# Patient Record
Sex: FEMALE | Race: WHITE | NOT HISPANIC OR LATINO | Employment: UNEMPLOYED | URBAN - METROPOLITAN AREA
[De-identification: names, ages, dates, MRNs, and addresses within clinical notes are randomized per-mention and may not be internally consistent; named-entity substitution may affect disease eponyms.]

---

## 2020-12-23 ENCOUNTER — OFFICE VISIT (OUTPATIENT)
Dept: FAMILY MEDICINE CLINIC | Facility: CLINIC | Age: 35
End: 2020-12-23

## 2020-12-23 ENCOUNTER — LAB (OUTPATIENT)
Dept: LAB | Facility: HOSPITAL | Age: 35
End: 2020-12-23

## 2020-12-23 VITALS
RESPIRATION RATE: 16 BRPM | OXYGEN SATURATION: 96 % | TEMPERATURE: 98 F | WEIGHT: 293 LBS | SYSTOLIC BLOOD PRESSURE: 150 MMHG | DIASTOLIC BLOOD PRESSURE: 84 MMHG | HEART RATE: 78 BPM

## 2020-12-23 DIAGNOSIS — E11.9 TYPE 2 DIABETES MELLITUS WITHOUT COMPLICATION, WITHOUT LONG-TERM CURRENT USE OF INSULIN (HCC): ICD-10-CM

## 2020-12-23 DIAGNOSIS — E11.9 TYPE 2 DIABETES MELLITUS WITHOUT COMPLICATION, WITHOUT LONG-TERM CURRENT USE OF INSULIN (HCC): Primary | ICD-10-CM

## 2020-12-23 PROBLEM — I10 BENIGN HYPERTENSION: Status: ACTIVE | Noted: 2020-12-23

## 2020-12-23 PROBLEM — M06.9 RHEUMATOID ARTHRITIS INVOLVING MULTIPLE SITES (HCC): Status: ACTIVE | Noted: 2020-12-23

## 2020-12-23 LAB
EST. AVERAGE GLUCOSE BLD GHB EST-MCNC: 148 MG/DL
HBA1C MFR BLD: 6.8 %

## 2020-12-23 PROCEDURE — 99203 OFFICE O/P NEW LOW 30 MIN: CPT | Performed by: PHYSICIAN ASSISTANT

## 2020-12-23 PROCEDURE — 36415 COLL VENOUS BLD VENIPUNCTURE: CPT

## 2020-12-23 PROCEDURE — 83036 HEMOGLOBIN GLYCOSYLATED A1C: CPT

## 2020-12-23 RX ORDER — OLMESARTAN MEDOXOMIL 40 MG/1
40 TABLET ORAL DAILY
COMMUNITY

## 2020-12-23 RX ORDER — GLYBURIDE 5 MG/1
5 TABLET ORAL
COMMUNITY

## 2020-12-24 DIAGNOSIS — E11.9 TYPE 2 DIABETES MELLITUS WITHOUT COMPLICATION, WITHOUT LONG-TERM CURRENT USE OF INSULIN (HCC): Primary | ICD-10-CM

## 2020-12-24 RX ORDER — PEN NEEDLE, DIABETIC 30 GX3/16"
NEEDLE, DISPOSABLE MISCELLANEOUS WEEKLY
Qty: 30 EACH | Refills: 0 | Status: SHIPPED | OUTPATIENT
Start: 2020-12-24

## 2020-12-24 RX ORDER — DULAGLUTIDE 0.75 MG/.5ML
0.75 INJECTION, SOLUTION SUBCUTANEOUS WEEKLY
Qty: 1 PEN | Refills: 0 | Status: SHIPPED | OUTPATIENT
Start: 2020-12-24 | End: 2021-01-29 | Stop reason: SDUPTHER

## 2020-12-29 ENCOUNTER — TELEPHONE (OUTPATIENT)
Dept: FAMILY MEDICINE CLINIC | Facility: CLINIC | Age: 35
End: 2020-12-29

## 2020-12-29 NOTE — TELEPHONE ENCOUNTER
Pt leave a msg in the nurse line stating that she don't know if you are going to send another refill on Trulicity since his sugar are below 200

## 2020-12-31 ENCOUNTER — TELEPHONE (OUTPATIENT)
Dept: FAMILY MEDICINE CLINIC | Facility: CLINIC | Age: 35
End: 2020-12-31

## 2021-01-12 DIAGNOSIS — E11.9 TYPE 2 DIABETES MELLITUS WITHOUT COMPLICATION, WITHOUT LONG-TERM CURRENT USE OF INSULIN (HCC): ICD-10-CM

## 2021-01-12 RX ORDER — PEN NEEDLE, DIABETIC 30 GX3/16"
NEEDLE, DISPOSABLE MISCELLANEOUS WEEKLY
Qty: 30 EACH | Refills: 0 | OUTPATIENT
Start: 2021-01-12

## 2021-01-12 RX ORDER — DULAGLUTIDE 0.75 MG/.5ML
0.75 INJECTION, SOLUTION SUBCUTANEOUS WEEKLY
Qty: 1 PEN | Refills: 0 | OUTPATIENT
Start: 2021-01-12

## 2021-01-12 NOTE — TELEPHONE ENCOUNTER
I called gabriella to schedule a follow up however he is only here temporarily he is a [facility] resident, pt states he will try and get in contact with his pcp

## 2021-01-15 ENCOUNTER — OFFICE VISIT (OUTPATIENT)
Dept: FAMILY MEDICINE CLINIC | Facility: CLINIC | Age: 36
End: 2021-01-15

## 2021-01-15 DIAGNOSIS — R51.9 NONINTRACTABLE HEADACHE, UNSPECIFIED CHRONICITY PATTERN, UNSPECIFIED HEADACHE TYPE: Primary | ICD-10-CM

## 2021-01-15 PROCEDURE — 99213 OFFICE O/P EST LOW 20 MIN: CPT | Performed by: FAMILY MEDICINE

## 2021-01-15 RX ORDER — FLUTICASONE PROPIONATE 50 MCG
1 SPRAY, SUSPENSION (ML) NASAL DAILY
Qty: 1 BOTTLE | Refills: 1 | Status: SHIPPED | OUTPATIENT
Start: 2021-01-15

## 2021-01-15 RX ORDER — CETIRIZINE HYDROCHLORIDE 10 MG/1
10 TABLET ORAL DAILY
Qty: 30 TABLET | Refills: 0 | Status: SHIPPED | OUTPATIENT
Start: 2021-01-15

## 2021-01-15 RX ORDER — FAMOTIDINE 20 MG/1
20 TABLET, FILM COATED ORAL 2 TIMES DAILY
Qty: 60 TABLET | Refills: 0 | Status: SHIPPED | OUTPATIENT
Start: 2021-01-15

## 2021-01-15 NOTE — LETTER
January 15, 2021     Patient: Мария Hernandez   YOB: 1985   Date of Visit: 1/15/2021       To Whom it May Concern:    Мария Hernandez is under my professional care  She was evaluated via televisit on 1/15/2021  She may return to work on 1/17/2021  If you have any questions or concerns, please don't hesitate to call           Sincerely,          [employer] [facility]        CC: No Recipients

## 2021-01-15 NOTE — PROGRESS NOTES
Virtual Brief Visit    Assessment/Plan:    Problem List Items Addressed This Visit        Other    Nonintractable headache - Primary     -pounding headache, sinus congestion   -last week- went to [facility]- had the Flu test  positive, covid negative  -had a little residual cough  -at work has tested  covid negative  -motrin -- every 6 hours today and yesterday  -vomiting, nausea  -no belly pain  -new type of hadache- back of eye; No blurry visoin; no dizziness, present there all day, light or lying down stil there  - very low white count Hx  -lives in [facility], is working here as a travel nurse staff  -follow up with hematology in   -Drinking water and crystal light  -discussed with patient that symptoms are more likely suggestive of nasal congestion that is worsening the headaches  -will send for zyrtec and flonase nasal spray  -also pepcid BID to take while taking motrin for abd relief  -Excuse for work today and tomorrow return on Sunday         Relevant Medications    famotidine (PEPCID) 20 mg tablet    fluticasone (FLONASE) 50 mcg/act nasal spray    cetirizine (ZyrTEC) 10 mg tablet                   Reason for visit is   Chief Complaint   Patient presents with    Virtual Brief Visit        Encounter provider Xenia Zimmer MD    Provider located at [address] [place] [id number] [phone]    Recent Visits  No visits were found meeting these conditions  Showing recent visits within past 7 days and meeting all other requirements     Today's Visits  Date Type Provider Dept   01/15/21 Office Visit Xenia Zimmer MD [facility]   Showing today's visits and meeting all other requirements     Future Appointments  No visits were found meeting these conditions     Showing future appointments within next 150 days and meeting all other requirements        After connecting through telephone, the patient was identified by name and date of birth  Burlene Gilford was informed that this is a telemedicine visit and that the visit is being conducted through telephone  My office door was closed  No one else was in the room  She acknowledged consent and understanding of privacy and security of the platform  The patient has agreed to participate and understands she can discontinue the visit at any time  Patient is aware this is a billable service  Subjective    Burlene Gilford is a 37 y o  female   Called via televisit for evaluation due to headaches  No past medical history on file  Past Surgical History:   Procedure Laterality Date    CHOLECYSTECTOMY         Current Outpatient Medications   Medication Sig Dispense Refill    cetirizine (ZyrTEC) 10 mg tablet Take 1 tablet (10 mg total) by mouth daily 30 tablet 0    Dulaglutide (Trulicity) 1 46 WP/9 2BO SOPN Inject 0 5 mL (0 75 mg total) under the skin once a week 1 pen 0    famotidine (PEPCID) 20 mg tablet Take 1 tablet (20 mg total) by mouth 2 (two) times a day 60 tablet 0    fluticasone (FLONASE) 50 mcg/act nasal spray 1 spray into each nostril daily 1 Bottle 1    glyBURIDE (DIABETA) 5 mg tablet Take 5 mg by mouth daily with breakfast      Insulin Pen Needle (Pen Needles) 32G X 4 MM MISC Use once a week 30 each 0    olmesartan (BENICAR) 40 mg tablet Take 40 mg by mouth daily       No current facility-administered medications for this visit  Allergies   Allergen Reactions    Amoxicillin Anaphylaxis       Review of Systems   Constitutional: Negative for fever  HENT: Positive for congestion  Eyes: Positive for visual disturbance  Respiratory: Negative for cough, shortness of breath and wheezing  Cardiovascular: Negative for chest pain, palpitations and leg swelling  Gastrointestinal: Positive for nausea  Neurological: Positive for headaches  Psychiatric/Behavioral: The patient is not nervous/anxious          There were no vitals filed for this visit  I spent 20 minutes directly with the patient during this visit    VIRTUAL VISIT DISCLAIMER    Laila Ham acknowledges that she has consented to an online visit or consultation  She understands that the online visit is based solely on information provided by her, and that, in the absence of a face-to-face physical evaluation by the physician, the diagnosis she receives is both limited and provisional in terms of accuracy and completeness  This is not intended to replace a full medical face-to-face evaluation by the physician  Laila Ham understands and accepts these terms

## 2021-01-15 NOTE — ASSESSMENT & PLAN NOTE
-pounding headache, sinus congestion   -last week- went to [facility]- had the Flu test  positive, covid negative  -had a little residual cough  -at work has tested  covid negative  -motrin -- every 6 hours today and yesterday  -vomiting, nausea  -no belly pain  -new type of hadache- back of eye; No blurry visoin; no dizziness, present there all day, light or lying down stil there  - very low white count Hx  -lives in [facility], is working here as a travel nurse staff  -follow up with hematology in [facility]  -Drinking water and crystal light  -discussed with patient that symptoms are more likely suggestive of nasal congestion that is worsening the headaches  -will send for zyrtec and flonase nasal spray  -also pepcid BID to take while taking motrin for abd relief  -Excuse for work today and tomorrow return on Sunday

## 2021-01-20 ENCOUNTER — OFFICE VISIT (OUTPATIENT)
Dept: FAMILY MEDICINE CLINIC | Facility: CLINIC | Age: 36
End: 2021-01-20

## 2021-01-20 DIAGNOSIS — Z20.2 POSSIBLE EXPOSURE TO STD: ICD-10-CM

## 2021-01-20 DIAGNOSIS — J02.9 PHARYNGITIS, UNSPECIFIED ETIOLOGY: Primary | ICD-10-CM

## 2021-01-20 PROCEDURE — 99214 OFFICE O/P EST MOD 30 MIN: CPT | Performed by: NURSE PRACTITIONER

## 2021-01-20 RX ORDER — PREDNISONE 20 MG/1
40 TABLET ORAL DAILY
Qty: 10 TABLET | Refills: 0 | Status: SHIPPED | OUTPATIENT
Start: 2021-01-20 | End: 2021-01-25

## 2021-01-20 RX ORDER — AZITHROMYCIN 250 MG/1
TABLET, FILM COATED ORAL
Qty: 6 TABLET | Refills: 0 | Status: SHIPPED | OUTPATIENT
Start: 2021-01-20 | End: 2021-01-24

## 2021-01-20 NOTE — PROGRESS NOTES
Virtual Regular Visit      Assessment/Plan:    Problem List Items Addressed This Visit     None      Visit Diagnoses     Pharyngitis, unspecified etiology    -  Primary    Relevant Medications    predniSONE 20 mg tablet    azithromycin (ZITHROMAX) 250 mg tablet    Other Relevant Orders    CBC and differential    HIV 1/2 Antigen/Antibody (4th Generation) w Reflex [facility]    RPR    Chlamydia/GC amplified DNA by PCR    Hepatitis C antibody    Possible exposure to STD        Relevant Orders    CBC and differential    HIV 1/2 Antigen/Antibody (4th Generation) w Reflex [facility]    RPR    Chlamydia/GC amplified DNA by PCR    Hepatitis C antibody               Reason for visit is   Chief Complaint   Patient presents with    Virtual Regular Visit        Encounter provider [facility]    Provider located at [address] [place] [id number] [phone]      Recent Visits  Date Type Provider Dept   01/15/21 Office Visit Addison Mello MD  [facility]   Showing recent visits within past 7 days and meeting all other requirements     Today's Visits  Date Type Provider Dept   01/20/21 [facility] Visit  [facility]   Showing today's visits and meeting all other requirements     Future Appointments  No visits were found meeting these conditions  Showing future appointments within next 150 days and meeting all other requirements        The patient was identified by name and date of birth  Reuben Ellison was informed that this is a telemedicine visit and that the visit is being conducted through [facility] and patient was informed that this is a secure, HIPAA-compliant platform  She agrees to proceed     My office door was closed  No one else was in the room  She acknowledged consent and understanding of privacy and security of the video platform   The patient has agreed to participate and understands they can discontinue the visit at any time  Patient is aware this is a billable service  Subjective  Jamarcus Pino is a 37 y o  female who presents today for same day virtual visit due to sore throat  Sore Throat  Patient complains of sore throat  Associated symptoms include sore throat  Onset of symptoms was several days ago, and have been gradually worsening since that time  She is drinking plenty of fluids  She has not had recent close exposure to someone with proven streptococcal pharyngitis  Patient states that the throat is tender and swollen on the left side  Is concerned that it may be STI related as has been recently sexually active  Has history of chlamydia, gonorrheae  No past medical history on file  Past Surgical History:   Procedure Laterality Date    CHOLECYSTECTOMY         Current Outpatient Medications   Medication Sig Dispense Refill    azithromycin (ZITHROMAX) 250 mg tablet Take 2 tablets today then 1 tablet daily x 4 days 6 tablet 0    cetirizine (ZyrTEC) 10 mg tablet Take 1 tablet (10 mg total) by mouth daily 30 tablet 0    Dulaglutide (Trulicity) 5 80 HO/2 7ZY SOPN Inject 0 5 mL (0 75 mg total) under the skin once a week 1 pen 0    famotidine (PEPCID) 20 mg tablet Take 1 tablet (20 mg total) by mouth 2 (two) times a day 60 tablet 0    fluticasone (FLONASE) 50 mcg/act nasal spray 1 spray into each nostril daily 1 Bottle 1    glyBURIDE (DIABETA) 5 mg tablet Take 5 mg by mouth daily with breakfast      Insulin Pen Needle (Pen Needles) 32G X 4 MM MISC Use once a week 30 each 0    olmesartan (BENICAR) 40 mg tablet Take 40 mg by mouth daily      predniSONE 20 mg tablet Take 2 tablets (40 mg total) by mouth daily for 5 days 10 tablet 0     No current facility-administered medications for this visit  Allergies   Allergen Reactions    Amoxicillin Anaphylaxis       Review of Systems   Constitutional: Negative for chills and fever  HENT: Positive for sore throat and trouble swallowing  Negative for ear pain  Eyes: Negative for pain and visual disturbance  Respiratory: Negative for cough and shortness of breath  Cardiovascular: Negative for chest pain and palpitations  Gastrointestinal: Negative for abdominal pain and vomiting  Genitourinary: Negative for difficulty urinating, dysuria and hematuria  Musculoskeletal: Negative for arthralgias and back pain  Skin: Negative for color change and rash  Neurological: Negative for dizziness, seizures, syncope and headaches  All other systems reviewed and are negative  Video Exam    There were no vitals filed for this visit  Physical Exam  Constitutional:       General: She is not in acute distress  Appearance: She is not diaphoretic  Pulmonary:      Effort: Pulmonary effort is normal    Neurological:      Mental Status: She is alert and oriented to person, place, and time  Psychiatric:         Mood and Affect: Mood normal          Behavior: Behavior normal           I spent 15 minutes directly with the patient during this visit      VIRTUAL VISIT DISCLAIMER    Yanivgerman Tona acknowledges that she has consented to an online visit or consultation  She understands that the online visit is based solely on information provided by her, and that, in the absence of a face-to-face physical evaluation by the physician, the diagnosis she receives is both limited and provisional in terms of accuracy and completeness  This is not intended to replace a full medical face-to-face evaluation by the physician  Amee Carbone understands and accepts these terms

## 2021-01-29 ENCOUNTER — OFFICE VISIT (OUTPATIENT)
Dept: FAMILY MEDICINE CLINIC | Facility: CLINIC | Age: 36
End: 2021-01-29

## 2021-01-29 DIAGNOSIS — E11.9 TYPE 2 DIABETES MELLITUS WITHOUT COMPLICATION, WITHOUT LONG-TERM CURRENT USE OF INSULIN (HCC): ICD-10-CM

## 2021-01-29 DIAGNOSIS — J45.21 MILD INTERMITTENT ASTHMA WITH EXACERBATION: Primary | ICD-10-CM

## 2021-01-29 PROBLEM — J45.901 ASTHMA EXACERBATION: Status: ACTIVE | Noted: 2021-01-29

## 2021-01-29 PROCEDURE — 99213 OFFICE O/P EST LOW 20 MIN: CPT | Performed by: FAMILY MEDICINE

## 2021-01-29 RX ORDER — PREDNISONE 20 MG/1
40 TABLET ORAL DAILY
Qty: 10 TABLET | Refills: 0 | Status: SHIPPED | OUTPATIENT
Start: 2021-01-29 | End: 2021-02-03

## 2021-01-29 RX ORDER — DULAGLUTIDE 0.75 MG/.5ML
0.75 INJECTION, SOLUTION SUBCUTANEOUS WEEKLY
Qty: 4 PEN | Refills: 2 | Status: SHIPPED | OUTPATIENT
Start: 2021-01-29

## 2021-01-29 NOTE — PROGRESS NOTES
Virtual Brief Visit    Assessment/Plan:    Problem List Items Addressed This Visit        Endocrine    Type 2 diabetes mellitus without complication, without long-term current use of insulin (HCC)    Relevant Medications    Dulaglutide (Trulicity) 2 27 NV/9 9LA SOPN       Respiratory    Asthma exacerbation - Primary     Symptoms are currently uncontrolled at this time   Avoid exposure to tobacco smoke, polluted air and other known asthma triggers    Monitor albuterol use to report back at follow up   Patient aware that increased albuterol use indicates poor control and may need further medication adjustment  - Mild asthma exacerbation   - Short burst steroid, mucolytics prn   - ED precautions reviewed             Relevant Medications    predniSONE 20 mg tablet    fluticasone-salmeterol (ADVAIR, WIXELA) 250-50 mcg/dose inhaler                Reason for visit is   Chief Complaint   Patient presents with    Virtual Brief Visit        Encounter provider [address] [place] [address]    Provider located at [address] [place] [address] Street   [address] [place] [id number] [phone]    Recent Visits  No visits were found meeting these conditions  Showing recent visits within past 7 days and meeting all other requirements     Today's Visits  Date Type Provider Dept   01/29/21 Office Visit  [facility]   Showing today's visits and meeting all other requirements     Future Appointments  No visits were found meeting these conditions  Showing future appointments within next 150 days and meeting all other requirements        After connecting through telephone, the patient was identified by name and date of birth  Marjan Porter was informed that this is a telemedicine visit and that the visit is being conducted through telephone  My office door was closed  No one else was in the room    She acknowledged consent and understanding of privacy and security of the platform  The patient has agreed to participate and understands she can discontinue the visit at any time  Patient is aware this is a billable service  Subjective    Andrea Zepeda is a 37 y o  Patient presents for sick visit  States he was in his usual state of health up until the past 3 days has been having occasional chest tightness with mild mucus production  States the symptoms are consistent with an exacerbation for him and usually resolves with oral steroids  At baseline he uses his inhaler approximately 2 times per week however over the past few days he has been using it 4-6 times daily  He denies any fever sick contacts recent travel  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, he is up-to-date with his flu shot denies any tobacco use  HPI     No past medical history on file  Past Surgical History:   Procedure Laterality Date    CHOLECYSTECTOMY         Current Outpatient Medications   Medication Sig Dispense Refill    cetirizine (ZyrTEC) 10 mg tablet Take 1 tablet (10 mg total) by mouth daily 30 tablet 0    Dulaglutide (Trulicity) 3 16 RY/8 9LG SOPN Inject 0 5 mL (0 75 mg total) under the skin once a week 4 pen 2    famotidine (PEPCID) 20 mg tablet Take 1 tablet (20 mg total) by mouth 2 (two) times a day 60 tablet 0    fluticasone (FLONASE) 50 mcg/act nasal spray 1 spray into each nostril daily 1 Bottle 1    fluticasone-salmeterol (ADVAIR, WIXELA) 250-50 mcg/dose inhaler Inhale 1 puff 2 (two) times a day Rinse mouth after use  1 Inhaler 1    glyBURIDE (DIABETA) 5 mg tablet Take 5 mg by mouth daily with breakfast      Insulin Pen Needle (Pen Needles) 32G X 4 MM MISC Use once a week 30 each 0    olmesartan (BENICAR) 40 mg tablet Take 40 mg by mouth daily      predniSONE 20 mg tablet Take 2 tablets (40 mg total) by mouth daily for 5 days 10 tablet 0     No current facility-administered medications for this visit           Allergies   Allergen Reactions    Amoxicillin Anaphylaxis Review of Systems   Constitutional: Negative for activity change, appetite change, fatigue, fever and unexpected weight change  HENT: Negative for sneezing, sore throat and tinnitus  Eyes: Negative for pain  Respiratory: Positive for cough, chest tightness and wheezing  Negative for choking  Cardiovascular: Negative for chest pain, palpitations and leg swelling  Gastrointestinal: Negative for abdominal distention, abdominal pain, constipation, diarrhea, nausea and vomiting  Endocrine: Negative for polydipsia, polyphagia and polyuria  Genitourinary: Negative for dysuria and flank pain  Musculoskeletal: Negative for back pain  Neurological: Negative for dizziness, seizures, syncope, speech difficulty, light-headedness and numbness  Psychiatric/Behavioral: Negative for agitation  There were no vitals filed for this visit  I spent 14 minutes directly with the patient during this visit    VIRTUAL VISIT DISCLAIMER    Thomas Malone acknowledges that she has consented to an online visit or consultation  She understands that the online visit is based solely on information provided by her, and that, in the absence of a face-to-face physical evaluation by the physician, the diagnosis she receives is both limited and provisional in terms of accuracy and completeness  This is not intended to replace a full medical face-to-face evaluation by the physician  Thomas Malone understands and accepts these terms

## 2021-01-29 NOTE — ASSESSMENT & PLAN NOTE
Symptoms are currently uncontrolled at this time   Avoid exposure to tobacco smoke, polluted air and other known asthma triggers    Monitor albuterol use to report back at follow up   Patient aware that increased albuterol use indicates poor control and may need further medication adjustment      - Mild asthma exacerbation   - Short burst steroid, mucolytics prn   - ED precautions reviewed